# Patient Record
Sex: MALE | Race: WHITE | NOT HISPANIC OR LATINO | Employment: FULL TIME | URBAN - METROPOLITAN AREA
[De-identification: names, ages, dates, MRNs, and addresses within clinical notes are randomized per-mention and may not be internally consistent; named-entity substitution may affect disease eponyms.]

---

## 2022-02-10 DIAGNOSIS — R42 MAL DE DEBARQUEMENT: Primary | ICD-10-CM

## 2022-02-10 DIAGNOSIS — G54.0 TOS (THORACIC OUTLET SYNDROME): ICD-10-CM

## 2022-02-10 DIAGNOSIS — I87.1 COMPRESSION OF VEIN: ICD-10-CM

## 2022-02-11 ENCOUNTER — TELEPHONE (OUTPATIENT)
Dept: NEUROLOGY | Facility: CLINIC | Age: 57
End: 2022-02-11
Payer: COMMERCIAL

## 2022-02-11 NOTE — TELEPHONE ENCOUNTER
I called pt to review his questions. He sees ENT who diagnosed MdDS and prescribes his klonopin which helps his symptoms some.     He originally had MdDS symptoms last 1 year, remission 2 years, back the last 1 1/2 years after kayaking.     He is from [place] and has family here. He is planning to stay here around 1-2 weeks when he comes up to see Dr. Hernandez. He will schedule ordered imaging. He is wondering if anything else should be set up while here.     I let him know we can give him our recommendations and he can coordinate treatment in [place].     He wanted to know common treatments we use. We discussed PT, botox, [facility]. He has done some PT with no relief. Was wondering what exactly our therapists work on.     Will update Dr. Hernandez on our conversation.     Elba SANTORO

## 2022-04-03 ENCOUNTER — HEALTH MAINTENANCE LETTER (OUTPATIENT)
Age: 57
End: 2022-04-03

## 2022-05-10 ENCOUNTER — TRANSFERRED RECORDS (OUTPATIENT)
Dept: HEALTH INFORMATION MANAGEMENT | Facility: CLINIC | Age: 57
End: 2022-05-10

## 2022-06-26 NOTE — TELEPHONE ENCOUNTER
6/27/2022-[employer] Medical Message sent to patient asking for info on where to get records and Images for his appointment-MR @ 527am        FUTURE VISIT INFORMATION      FUTURE VISIT INFORMATION:    Date: 7/13/2022    Time: 130pm    Location: [facility] [place]  REFERRAL INFORMATION:    Referring provider:      Referring providers clinic:      Reason for visit/diagnosis  Mal de Debarquement     RECORDS REQUESTED FROM:       Clinic name Comments Records Status Imaging Status   [facility]   Scanned to Chart No Images

## 2022-07-12 ENCOUNTER — ANCILLARY PROCEDURE (OUTPATIENT)
Dept: CT IMAGING | Facility: CLINIC | Age: 57
End: 2022-07-12
Attending: PSYCHIATRY & NEUROLOGY
Payer: COMMERCIAL

## 2022-07-12 DIAGNOSIS — R42 MAL DE DEBARQUEMENT: ICD-10-CM

## 2022-07-12 DIAGNOSIS — I87.1 COMPRESSION OF VEIN: ICD-10-CM

## 2022-07-12 DIAGNOSIS — G54.0 TOS (THORACIC OUTLET SYNDROME): ICD-10-CM

## 2022-07-12 PROCEDURE — 70498 CT ANGIOGRAPHY NECK: CPT | Performed by: RADIOLOGY

## 2022-07-12 PROCEDURE — 70496 CT ANGIOGRAPHY HEAD: CPT | Performed by: RADIOLOGY

## 2022-07-12 RX ORDER — IOPAMIDOL 755 MG/ML
75 INJECTION, SOLUTION INTRAVASCULAR ONCE
Status: COMPLETED | OUTPATIENT
Start: 2022-07-12 | End: 2022-07-12

## 2022-07-12 RX ADMIN — IOPAMIDOL 75 ML: 755 INJECTION, SOLUTION INTRAVASCULAR at 11:21

## 2022-07-12 NOTE — PROGRESS NOTES
[facility]    Neurology Consult    7/13/2022      Fer Cantu MRN# [id number]   YOB: 1965 Age: 56 year old      Primary care provider:   No primary care provider on file.    Requesting provider:   Self    Reason for Consult:  Mal de Debarquement Syndrome    IMPRESSIONS:  Fer Cantu is a 56 year old male with a past medical history of two episodes of mal de debarquement syndrome one after a cruise in November 2017 and one after a kayak expedition in October 2020.  Symptoms occurred within 48 hours of disembarking from the vessels and improved with re-exposure exposure to passive motion such as driving.  He thus meets [employer] Society criteria for mal de debarquement syndrome.     He has had some response to clonazepam.  He did not have sustained response to venlafaxine.  He is off the second medication now.  We talked about alternatives to medication as well as the possibility that some of the imaging findings from the ultrasound and CT venogram and his neck tightness may be related to be feeling of motion.  This is a newly consider possibility, i.e. that some of the feeling of oscillating motion could be from increased venous pressure in the inner ear due to extracranial venous compression particularly from tight muscles in the neck.  We discussed this pathophysiology and I showed him pictures of the areas of compression in the neck. There is some compression of the right internal jugular vein at the level of C1 with head flexion as well as slowed flow in the right subclavian vein with arm abduction.     We do not have the full picture of how extracranial venous compressions can lead to oscillating vertigo but we certainly have had patients who have benefited from manual therapies such as upper cervical spinal alignment procedures and neck physical therapy in relieving at least some of the feelings of oscillating motion.  Thus, it would be  worthwhile for him to explore both avenues. 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     Recommendations:  1.  If more medication options are considered I would try 25 mg of desvenlafaxine.  It can be raised to 50 mg a day after 4 weeks.  2.  Clonazepam can be continued at his current dose but I would not raise the total dose to more than 1 mg/day. The dose could be divided into 0.5 mg twice a day.  3.  I would recommend seeking care from a [facility] chiropractor in his area.  This form of chiropractic involves atlas alignment without any high acceleration neck twisting.  4.  He would benefit from seeing a physical therapist who is knowledgeable in treating thoracic outlet syndrome.  Of note he does not have any arm symptoms but does have evidence of subclavian vein compression.  The physical therapy should focus on first rib mobilization, anterior scalene and sternocleidomastoid muscle stretching, pectoralis minor stretches, ergonomic awareness and attention to posture especially when he is in front of the computer.  5. Since the patient is from [place], we have not scheduled a follow up but he is welcome to send me questions on [employer].     HISTORY OF PRESENT ILLNESS:  Fer Cantu is a 56 year old male presents with persistent oscillating vertigo after disembarking from a cruise (5 days, [facility] from [place]) on November 2017. He dd not get sick but there were strong wakes on the last day of the trip. He did not get motion sick. After getting off the cruise, he felt the motion right away. He had felt a gravitational pull, the floor moving, and rocking/awaying that got better driving car.  The rocking does not go with his pulse. He saw an ENT and had an audiogram and was diagnosed with MdDS. He had not had vestibular function testing. He was treated with clonazepam which did help reduce the symptoms. He also tried CBD which did not help.     The symptoms lasted a  year and subsided on its own. However, he had taken Bactrim two weeks before the symptoms went away so he wonders about that association. He had been cruises for up to a month in a 1990's and he would be totally fine. He never had a problem with motion sickness even when the roll stabilizers were off.    He was fine for two years. He went off of the clonazepam.. He went on a kayak a few times during his asymptomatic periods and he was fine.    In October 2020 the symptoms came back after a kayak trip. He was out on the water for an hour but it was very windy and the wakes were very strong. He was not scared but was actually having fun. He was fine the day of the trip. The very next day, the symptoms of the rocking came back very strong. He felt as if the ground was moving.     The symptoms have never gone away since then. The strongest sensation is the rocking/sensation and a gravitational pull downward. There is still improvement with driving. He had tried the VOR re-adaptation therapy with some instructions around January 2022. This did not help.     He has been managed with clonazepam 1 mg a day in the morning and venlafaxine which had recently been increased to 150 mg a day and then taken off because it didn't help. It seemed to help in the beginning, however. He did not have withdrawal effects when he stopped. He has not been on any other medications for this.     The rocking does not get stronger in any particular head direction. The rocking is better when he lays down. It does not prevent him from falling asleep. He has become more sensitive to computer work. He is not very visually motion sensitive, otherwise. There has been fatigue because the constant motion is distracting. He notices that symptoms are milder when he gets more sleep. He usually sleeps 6-7-hours. His symptoms are stronger when he doesn't get enough sleep.    Dizziness:   He has never had not spinning vertigo.     Headache:   There has been  a little bit of pulsing pressure in the temples that goes with rocking but not with the pulse. The cycling is slower than the pulse.     Aural:   There has been some ear pressure that comes and goes, not with the rocking. There is no tinnitus or hearing loss.    Neck/Upper extremity:  There is some neck stiffness at the back of the neck on both sides. There is no tightness in the shoulders. There is no numbness, tingling, or weakness in the arms or hands. There has been no swelling or color change in the hands.     Bulbar:  No throat pain, swallowing problems.     Cardiac:  No chest pain shortness of breath, palpitations, syncope. He does not feel faint.     OTHER: He walks for exercise. He does not do any upper body exercise. He has stopped doing kayaking.     PAST MEDICAL HISTORY:  Hypertension  BPH    PAST SURGICAL HISTORY:  None    SOCIAL HISTORY: , never smoked, working full time.     ALLERGIES:  No Known Allergies     MEDICATIONS:    Current Outpatient Medications:      clonazePAM (KLONOPIN) 1 MG tablet, TAKE ONE (1) TABLET (1 MG TOTAL) BY MOUTH ONCE DAILY FOR VERTIGO, Disp: , Rfl:      doxazosin (CARDURA) 4 MG tablet, TAKE ONE (1) TABLET(S) BY MOUTH EVERY DAY FOR BLOOD PRESSURE AND PROSTATE, TAKE 4 HOURS APART FROM SILDENAFIL., Disp: , Rfl:      lisinopril (ZESTRIL) 20 MG tablet, Take 1 tablet by mouth daily, Disp: , Rfl:     PHYSICAL EXAM:  Vitals: BP (!) 147/84   Pulse 89   Resp 16   Wt 91.6 kg (202 lb)   SpO2 98%     General: Patient is well-nourished, well-groomed, in no apparent distress. There was no dominant rhythm.     HEENT: Head is atraumatic, eyes look normal exteriorly, throat clear, neck supple.  Ext: Warm, well-perfused. No edema.    Neurologic:  Mental Status: Alert and oriented to person, place, time, and situation.  Able to provide an excellent history.     Cranial Nerves: Visual fields full to confrontation.  Pupils equal and reactive to light.  Extraocular movements full.  Face  sensation normal.  Normal head impulse testing.  Normal hearing to finger rub. Face symmetric with normal movements. Tongue and palate midline.  Normal shoulder elevation.      Motor: Normal bulk and tone.  No pronator drift.  Normal foot taps.  Full strength to confrontational testing.    Sensory: Normal light touch, temperature, and vibration.    Reflexes: Biceps, Brachioradialis, Triceps, Knees, Ankles 2/4.     Coordination: Normal finger to nose, rapid alternating movements    Gait: Normal stance width.  Negative Romberg.  Good gait initiation.  Good stride length.  Good arm swing.  Normal turn. Able to walk 5 steps in tandem.      Upper Limb Tension Test for Thoracic Outlet Syndrome:  Arms abducted: Numbness and tingling: none    Arms abducted head turned to the RIGHT: none  Arms abducted head turned to the LEFT: none    Pain Right scalene: none  Pain Left scalene: none    Pain Right sternocleidomastoid: none  Pain Left sternocleidomastoid: none    Pain under the RIGHT clavicle: none  Pain at the RIGHT 1st rib-sternum insertion site: none  Pain under the LEFT clavicle: none  Pain at the LEFT 1st rib-sternum insertion site: YES, no radiation     DATA:  All available and relevant labs, imaging, and other procedures were reviewed personally.   Last brain imaging:  CTV Head w Contrast  Narrative: EXAM: CTV HEAD NECK W CONTRAST, 7/12/2022 11:42 AM    HISTORY:  56M with chronic rocking vertigo, question venous  compression, styloid length; Mal de debarquement; Compression of vein;  TOS (thoracic outlet syndrome).    COMPARISON:  Venous ultrasound 7/12/2022.      TECHNIQUE: Helically acquired thin section axial CT images were  obtained with 1 mm collimation through the head and neck after  intravenous bolus injection of iodinated contrast medium with a delay  between administration of contrast and scanning. Imaging performed in  the neutral position and with neck in flexion. Image data were sent to  the 3D workstation  and postprocessed by the technologist using maximum  intensity pixel (MIP), multiplanar and volume rendered 3D  reconstruction programs.     CONTRAST: Isovue 370 75cc.    FINDINGS:     Head CTV:  No thrombosis or stenosis of the major intracranial dural sinuses or  deep cerebral veins. Dominant left-sided venous drainage.    Neck CTV:  The right styloid process measures 26 mm. The left styloid process  measures 23 mm.    No venous thrombosis.    Right internal jugular vein: Mild narrowing of the upper right  internal jugular vein as it courses posterior to the right styloid  process. Increase in extent of narrowing with flexion.    Left internal jugular vein: Moderate narrowing of the upper left  internal jugular vein as it courses between the posterior belly of the  digastric in the left transverse process of C1. Increase in extent of  narrowing with flexion.    Extensive effacement of the bilateral mid internal jugular veins,  greatest on the right.    Mild paranasal sinus mucosal thickening. Mild atherosclerotic  calcifications of the bilateral carotid bifurcations. No suspicious  finding in the visualized superior mediastinum/thorax. Clear lung  apices.  Impression: IMPRESSION:   1. No intracranial venous thrombosis.  2. No jugular venous thrombosis.  3. Narrowing of the bilateral upper internal jugular veins, increasing  in extent with flexion. This is of indeterminate significance and can  be seen in the absence of symptoms.    SARINA LONDONO MD               [facility] Thor Outlet Syn Bilat  Narrative: THORACIC INLET/OUTLET DUPLEX ULTRASOUND 7/12/2022     CLINICAL HISTORY: 56M with chronic rocking vertigo. Question venous  compression.; Mal de debarquement; Compression of vein; TOS (thoracic  outlet syndrome).     COMPARISONS: None available.    REFERRING PROVIDER: PRINCESS ROMERO CHA    TECHNIQUE: Bilateral innominate, subclavian, and axillary veins were  evaluated grayscale, color Doppler, Doppler  waveform ultrasound.  Bilateral subclavian veins were evaluated with color Doppler and  Doppler waveform imaging through abduction maneuvers.    Bilateral index finger PPG's obtained at rest and with provocative  positions.    Bilateral internal jugular veins evaluated at rest with grayscale,  color Doppler, and Doppler waveform ultrasound. Bilateral internal  jugular veins evaluated with color Doppler and Doppler waveform  ultrasound through maneuvers.    FINDINGS:  RIGHT:       REST:            INTERNAL JUGULAR VEIN: 26 cm/s, phasic, fully compressible            INNOMINATE VEIN: 70 cm/s, phasic            SUBCLAVIAN VEIN, medial: 70 cm/s, phasic            SUBCLAVIAN VEIN, mid: 99 cm/s, phasic, fully compressible            SUBCLAVIAN VEIN, lateral: 43 cm/s, phasic, fully  compressible            AXILLARY VEIN: 55 cm/s, phasic, fully compressible         MID SUBCLAVIAN VEIN, sitting erect:            0 degrees: 83 cm/s, phasic            90 degrees: 20 cm/s, flattened            135 degrees: 153 cm/s, phasic            180 degrees: 214 cm/s, phasic         INTERNAL JUGULAR VEIN, sitting erect:            Neutral: 108 cm/s, phasic            Right: 169 cm/s, phasic            Left: 122 cm/s, phasic            Extension: 145 cm/s, phasic            Flexion: 125 cm/s, phasic         PPGs:            Baseline: Normal            Arms 90: Normal            Arms 180: Normal              : ABNORMAL - diminished             head right: Normal             head left: ABNORMAL - OCCLUDED    LEFT:       REST:            INTERNAL JUGULAR VEIN: 128 cm/s, phasic, fully compressible            INNOMINATE VEIN: 65 cm/s, phasic            SUBCLAVIAN VEIN, medial: 167 cm/s, phasic            SUBCLAVIAN VEIN, mid: 162 cm/s, phasic, fully compressible            SUBCLAVIAN VEIN, lateral: 96 cm/s, phasic, fully  compressible            AXILLARY VEIN: 37 cm/s, phasic, fully compressible         MID SUBCLAVIAN  VEIN, sitting erect:            0 degrees: 113 cm/s, phasic            90 degrees: 240 cm/s, phasic            135 degrees: 259 cm/s, phasic            180 degrees: 233 cm/s, phasic         INTERNAL JUGULAR VEIN, sitting erect:            Neutral: 142 cm/s, phasic            Right: 214 cm/s, phasic            Left: 175 cm/s, phasic            Extension: 172 cm/s, phasic            Flexion: 214 cm/s, phasic        PPGs:            Baseline: Normal            Arms 90: Normal            Arms 180: ABNORMAL - diminished            : Normal             head right: Normal             head left: Normal  Impression: IMPRESSION: Thoracic outlet/inlet physiology suggested. Correlate for  symptoms.    1. RIGHT:       A. No subclavian venous stenosis suggested at rest.       B. Subclavian venous narrowing suggested in 90 degrees. No  occlusion demonstrated.       C. No internal jugular venous stenosis suggested with maneuvers.       D. PPG occlusive in  head left and diminished in   position.    2. LEFT:       A. No subclavian venous stenosis suggested at rest.       B. No subclavian venous stenosis suggested with maneuvers.       C. Left internal jugular vein velocity almost 5 times faster than  the right, etiology not demonstrated.  No internal jugular venous  stenosis suggested with maneuvers.       D. PPG diminished in Arms 180. No occlusion demonstrated.    OSITO ERWIN MD     94-minutes were spent in evaluation, examination, and documentation.

## 2022-07-13 ENCOUNTER — OFFICE VISIT (OUTPATIENT)
Dept: NEUROLOGY | Facility: CLINIC | Age: 57
End: 2022-07-13
Payer: COMMERCIAL

## 2022-07-13 ENCOUNTER — PRE VISIT (OUTPATIENT)
Dept: NEUROLOGY | Facility: CLINIC | Age: 57
End: 2022-07-13

## 2022-07-13 VITALS
DIASTOLIC BLOOD PRESSURE: 84 MMHG | SYSTOLIC BLOOD PRESSURE: 147 MMHG | WEIGHT: 202 LBS | HEART RATE: 89 BPM | RESPIRATION RATE: 16 BRPM | OXYGEN SATURATION: 98 %

## 2022-07-13 DIAGNOSIS — G54.0 TOS (THORACIC OUTLET SYNDROME): ICD-10-CM

## 2022-07-13 DIAGNOSIS — M24.20 EAGLE'S SYNDROME: ICD-10-CM

## 2022-07-13 DIAGNOSIS — G24.3 CERVICAL DYSTONIA: Primary | ICD-10-CM

## 2022-07-13 DIAGNOSIS — I87.1 COMPRESSION OF VEIN: ICD-10-CM

## 2022-07-13 DIAGNOSIS — R42 MAL DE DEBARQUEMENT: ICD-10-CM

## 2022-07-13 DIAGNOSIS — M79.10 MUSCLE PAIN: ICD-10-CM

## 2022-07-13 PROCEDURE — 99417 PROLNG OP E/M EACH 15 MIN: CPT | Performed by: PSYCHIATRY & NEUROLOGY

## 2022-07-13 PROCEDURE — 99205 OFFICE O/P NEW HI 60 MIN: CPT | Performed by: PSYCHIATRY & NEUROLOGY

## 2022-07-13 RX ORDER — DOXYCYCLINE 100 MG/1
CAPSULE ORAL
COMMUNITY
Start: 2021-08-30 | End: 2023-10-06

## 2022-07-13 RX ORDER — CLONAZEPAM 1 MG/1
TABLET ORAL
COMMUNITY
Start: 2021-10-13

## 2022-07-13 RX ORDER — DOXAZOSIN 4 MG/1
TABLET ORAL
COMMUNITY
Start: 2020-01-01

## 2022-07-13 RX ORDER — LISINOPRIL 20 MG/1
1 TABLET ORAL DAILY
COMMUNITY
Start: 2013-01-01

## 2022-07-13 RX ORDER — DOXYCYCLINE 100 MG/1
CAPSULE ORAL
COMMUNITY
Start: 2021-11-10 | End: 2023-10-06

## 2022-07-13 ASSESSMENT — PAIN SCALES - GENERAL: PAINLEVEL: NO PAIN (0)

## 2022-07-13 NOTE — LETTER
7/13/2022     RE: Fer Cantu  [id number]  [facility] [address]     Dear Colleague,    Thank you for referring your patient, Fer Cantu, to the [facility] NEUROLOGY [facility] [place] at [facility]. Please see a copy of my visit note below.    [facility]    Neurology Consult    7/13/2022      Fer Cantu MRN# [id number]   YOB: 1965 Age: 56 year old      Primary care provider:   No primary care provider on file.    Requesting provider:   Self    Reason for Consult:  Mal de Debarquement Syndrome    IMPRESSIONS:  Fer Cantu is a 56 year old male with a past medical history of two episodes of mal de debarquement syndrome one after a cruise in November 2017 and one after a kayak expedition in October 2020.  Symptoms occurred within 48 hours of disembarking from the vessels and improved with re-exposure exposure to passive motion such as driving.  He thus meets [employer] Society criteria for mal de debarquement syndrome.     He has had some response to clonazepam.  He did not have sustained response to venlafaxine.  He is off the second medication now.  We talked about alternatives to medication as well as the possibility that some of the imaging findings from the ultrasound and CT venogram and his neck tightness may be related to be feeling of motion.  This is a newly consider possibility, i.e. that some of the feeling of oscillating motion could be from increased venous pressure in the inner ear due to extracranial venous compression.  We discussed this pathophysiology and I showed him pictures of the areas of compression in the neck. There is some compression of the right internal jugular vein at the level of C1 with head flexion as well as slowed flow in the right subclavian vein with arm abduction.     We do not have the full picture of how extracranial venous  compressions can lead to oscillating vertigo but we certainly have had patients who have benefited from manual therapies such as upper cervical spinal alignment procedures and neck physical therapy in relieving at least some of the feelings of oscillating motion.  Thus, it would be worthwhile for him to explore both avenues.    Recommendations:  1.  If more medication options are considered I would try 25 mg of desvenlafaxine.  It can be raised to 50 mg a day after 4 weeks.  2.  Clonazepam can be continued at his current dose but I would not raise the total dose to more than 1 mg/day. The dose could be divided into 0.5 mg twice a day.  3.  I would recommend seeking care from a [facility] chiropractor in his area.  This form of chiropractic involves atlas alignment without any high acceleration neck twisting.  4.  He would benefit from seeing a physical therapist who is knowledgeable in treating thoracic outlet syndrome.  Of note he does not have any arm symptoms but does have evidence of subclavian vein compression.  The physical therapy should focus on first rib mobilization, anterior scalene and sternocleidomastoid muscle stretching, pectoralis minor stretches, ergonomic awareness and attention to posture especially when he is in front of the computer.  5. Since the patient is from [place], we have not scheduled a follow up but he is welcome to send me questions on [employer].     HISTORY OF PRESENT ILLNESS:  Fer Cantu is a 56 year old male presents with persistent oscillating vertigo after disembarking from a cruise (5 days, [place] from [place]) on November 2017. He dd not get sick but there were strong wakes on the last day of the trip. He did not get motion sick. After getting off the cruise, he felt the motion right away. He had felt a gravitational pull, the floor moving, and rocking/awaying that got better driving car.  The rocking does not go with his pulse. He saw an ENT and had an audiogram and was  diagnosed with MdDS. He had not had vestibular function testing. He was treated with clonazepam which did help reduce the symptoms. He also tried CBD which did not help.     The symptoms lasted a year and subsided on its own. However, he had taken Bactrim two weeks before the symptoms went away so he wonders about that association. He had been cruises for up to a month in a 1990's and he would be totally fine. He never had a problem with motion sickness even when the roll stabilizers were off.    He was fine for two years. He went off of the clonazepam.. He went on a kayak a few times during his asymptomatic periods and he was fine.    In October 2020 the symptoms came back after a kayak trip. He was out on the water for an hour but it was very windy and the wakes were very strong. He was not scared but was actually having fun. He was fine the day of the trip. The very next day, the symptoms of the rocking came back very strong. He felt as if the ground was moving.     The symptoms have never gone away since then. The strongest sensation is the rocking/sensation and a gravitational pull downward. There is still improvement with driving. He had tried the VOR re-adaptation therapy with some instructions around January 2022. This did not help.     He has been managed with clonazepam 1 mg a day in the morning and venlafaxine which had recently been increased to 150 mg a day and then taken off because it didn't help. It seemed to help in the beginning, however. He did not have withdrawal effects when he stopped. He has not been on any other medications for this.     The rocking does not get stronger in any particular head direction. The rocking is better when he lays down. It does not prevent him from falling asleep. He has become more sensitive to computer work. He is not very visually motion sensitive, otherwise. There has been fatigue because the constant motion is distracting. He notices that symptoms are milder when  he gets more sleep. He usually sleeps 6-7-hours. His symptoms are stronger when he doesn't get enough sleep.    Dizziness:   He has never had not spinning vertigo.     Headache:   There has been a little bit of pulsing pressure in the temples that goes with rocking but not with the pulse. The cycling is slower than the pulse.     Aural:   There has been some ear pressure that comes and goes, not with the rocking. There is no tinnitus or hearing loss.    Neck/Upper extremity:  There is no neck pain. There is some neck stiffness at the back of the neck on both sides. There is no tightness in the shoulders. There is no numbness, tingling, or weakness in the arms or hands. There has been no swelling or color change in the hands.     Bulbar:  No throat pain, swallowing problems.     Cardiac:  No chest pain shortness of breath, palpitations, syncope. He does not feel faint.     OTHER: He walks for exercise. He does not do any upper body exercise. He has stopped doing kayaking.     PAST MEDICAL HISTORY:  Hypertension  BPH    PAST SURGICAL HISTORY:  None    SOCIAL HISTORY: , never smoked, working full time.     ALLERGIES:  No Known Allergies     MEDICATIONS:    Current Outpatient Medications:      clonazePAM (KLONOPIN) 1 MG tablet, TAKE ONE (1) TABLET (1 MG TOTAL) BY MOUTH ONCE DAILY FOR VERTIGO, Disp: , Rfl:      doxazosin (CARDURA) 4 MG tablet, TAKE ONE (1) TABLET(S) BY MOUTH EVERY DAY FOR BLOOD PRESSURE AND PROSTATE, TAKE 4 HOURS APART FROM SILDENAFIL., Disp: , Rfl:      lisinopril (ZESTRIL) 20 MG tablet, Take 1 tablet by mouth daily, Disp: , Rfl:     PHYSICAL EXAM:  Vitals: BP (!) 147/84   Pulse 89   Resp 16   Wt 91.6 kg (202 lb)   SpO2 98%     General: Patient is well-nourished, well-groomed, in no apparent distress. There was no dominant rhythm.     HEENT: Head is atraumatic, eyes look normal exteriorly, throat clear, neck supple.  Ext: Warm, well-perfused. No edema.    Neurologic:  Mental Status: Alert and  oriented to person, place, time, and situation.  Able to provide an excellent history.     Cranial Nerves: Visual fields full to confrontation.  Pupils equal and reactive to light.  Extraocular movements full.  Face sensation normal.  Normal head impulse testing.  Normal hearing to finger rub. Face symmetric with normal movements. Tongue and palate midline.  Normal shoulder elevation.      Motor: Normal bulk and tone.  No pronator drift.  Normal foot taps.  Full strength to confrontational testing.    Sensory: Normal light touch, temperature, and vibration.    Reflexes: Biceps, Brachioradialis, Triceps, Knees, Ankles 2/4.     Coordination: Normal finger to nose, rapid alternating movements    Gait: Normal stance width.  Negative Romberg.  Good gait initiation.  Good stride length.  Good arm swing.  Normal turn. Able to walk 5 steps in tandem.      Upper Limb Tension Test for Thoracic Outlet Syndrome:  Arms abducted: Numbness and tingling: none    Arms abducted head turned to the RIGHT: none  Arms abducted head turned to the LEFT: none    Pain Right scalene: none  Pain Left scalene: none    Pain Right sternocleidomastoid: none  Pain Left sternocleidomastoid: none    Pain under the RIGHT clavicle: none  Pain at the RIGHT 1st rib-sternum insertion site: none  Pain under the LEFT clavicle: none  Pain at the LEFT 1st rib-sternum insertion site: YES, no radiation     DATA:  All available and relevant labs, imaging, and other procedures were reviewed personally.   Last brain imaging:  CTV Head w Contrast  Narrative: EXAM: CTV HEAD NECK W CONTRAST, 7/12/2022 11:42 AM    HISTORY:  56M with chronic rocking vertigo, question venous  compression, styloid length; Mal de debarquement; Compression of vein;  TOS (thoracic outlet syndrome).    COMPARISON:  Venous ultrasound 7/12/2022.      TECHNIQUE: Helically acquired thin section axial CT images were  obtained with 1 mm collimation through the head and neck after  intravenous  bolus injection of iodinated contrast medium with a delay  between administration of contrast and scanning. Imaging performed in  the neutral position and with neck in flexion. Image data were sent to  the 3D workstation and postprocessed by the technologist using maximum  intensity pixel (MIP), multiplanar and volume rendered 3D  reconstruction programs.     CONTRAST: Isovue 370 75cc.    FINDINGS:     Head CTV:  No thrombosis or stenosis of the major intracranial dural sinuses or  deep cerebral veins. Dominant left-sided venous drainage.    Neck CTV:  The right styloid process measures 26 mm. The left styloid process  measures 23 mm.    No venous thrombosis.    Right internal jugular vein: Mild narrowing of the upper right  internal jugular vein as it courses posterior to the right styloid  process. Increase in extent of narrowing with flexion.    Left internal jugular vein: Moderate narrowing of the upper left  internal jugular vein as it courses between the posterior belly of the  digastric in the left transverse process of C1. Increase in extent of  narrowing with flexion.    Extensive effacement of the bilateral mid internal jugular veins,  greatest on the right.    Mild paranasal sinus mucosal thickening. Mild atherosclerotic  calcifications of the bilateral carotid bifurcations. No suspicious  finding in the visualized superior mediastinum/thorax. Clear lung  apices.  Impression: IMPRESSION:   1. No intracranial venous thrombosis.  2. No jugular venous thrombosis.  3. Narrowing of the bilateral upper internal jugular veins, increasing  in extent with flexion. This is of indeterminate significance and can  be seen in the absence of symptoms.    SARINA LONDONO MD               US Up Ex [facility] Oneil Thor Outlet Syn Bilat  Narrative: THORACIC INLET/OUTLET DUPLEX ULTRASOUND 7/12/2022     CLINICAL HISTORY: 56M with chronic rocking vertigo. Question venous  compression.; Mal de debarquement; Compression of vein; TOS  (thoracic  outlet syndrome).     COMPARISONS: None available.    REFERRING PROVIDER: PRINCESS ROMERO CHA    TECHNIQUE: Bilateral innominate, subclavian, and axillary veins were  evaluated grayscale, color Doppler, Doppler waveform ultrasound.  Bilateral subclavian veins were evaluated with color Doppler and  Doppler waveform imaging through abduction maneuvers.    Bilateral index finger PPG's obtained at rest and with provocative  positions.    Bilateral internal jugular veins evaluated at rest with grayscale,  color Doppler, and Doppler waveform ultrasound. Bilateral internal  jugular veins evaluated with color Doppler and Doppler waveform  ultrasound through maneuvers.    FINDINGS:  RIGHT:       REST:            INTERNAL JUGULAR VEIN: 26 cm/s, phasic, fully compressible            INNOMINATE VEIN: 70 cm/s, phasic            SUBCLAVIAN VEIN, medial: 70 cm/s, phasic            SUBCLAVIAN VEIN, mid: 99 cm/s, phasic, fully compressible            SUBCLAVIAN VEIN, lateral: 43 cm/s, phasic, fully  compressible            AXILLARY VEIN: 55 cm/s, phasic, fully compressible         MID SUBCLAVIAN VEIN, sitting erect:            0 degrees: 83 cm/s, phasic            90 degrees: 20 cm/s, flattened            135 degrees: 153 cm/s, phasic            180 degrees: 214 cm/s, phasic         INTERNAL JUGULAR VEIN, sitting erect:            Neutral: 108 cm/s, phasic            Right: 169 cm/s, phasic            Left: 122 cm/s, phasic            Extension: 145 cm/s, phasic            Flexion: 125 cm/s, phasic         PPGs:            Baseline: Normal            Arms 90: Normal            Arms 180: Normal              : ABNORMAL - diminished             head right: Normal             head left: ABNORMAL - OCCLUDED    LEFT:       REST:            INTERNAL JUGULAR VEIN: 128 cm/s, phasic, fully compressible            INNOMINATE VEIN: 65 cm/s, phasic            SUBCLAVIAN VEIN, medial: 167 cm/s, phasic             SUBCLAVIAN VEIN, mid: 162 cm/s, phasic, fully compressible            SUBCLAVIAN VEIN, lateral: 96 cm/s, phasic, fully  compressible            AXILLARY VEIN: 37 cm/s, phasic, fully compressible         MID SUBCLAVIAN VEIN, sitting erect:            0 degrees: 113 cm/s, phasic            90 degrees: 240 cm/s, phasic            135 degrees: 259 cm/s, phasic            180 degrees: 233 cm/s, phasic         INTERNAL JUGULAR VEIN, sitting erect:            Neutral: 142 cm/s, phasic            Right: 214 cm/s, phasic            Left: 175 cm/s, phasic            Extension: 172 cm/s, phasic            Flexion: 214 cm/s, phasic        PPGs:            Baseline: Normal            Arms 90: Normal            Arms 180: ABNORMAL - diminished            : Normal             head right: Normal             head left: Normal  Impression: IMPRESSION: Thoracic outlet/inlet physiology suggested. Correlate for  symptoms.    1. RIGHT:       A. No subclavian venous stenosis suggested at rest.       B. Subclavian venous narrowing suggested in 90 degrees. No  occlusion demonstrated.       C. No internal jugular venous stenosis suggested with maneuvers.       D. PPG occlusive in  head left and diminished in   position.    2. LEFT:       A. No subclavian venous stenosis suggested at rest.       B. No subclavian venous stenosis suggested with maneuvers.       C. Left internal jugular vein velocity almost 5 times faster than  the right, etiology not demonstrated.  No internal jugular venous  stenosis suggested with maneuvers.       D. PPG diminished in Arms 180. No occlusion demonstrated.    OSITO ERWIN MD     94-minutes were spent in evaluation, examination, and documentation.    Again, thank you for allowing me to participate in the care of your patient.      Sincerely,    Laya KEARNEY Cha, MD

## 2022-10-03 ENCOUNTER — HEALTH MAINTENANCE LETTER (OUTPATIENT)
Age: 57
End: 2022-10-03

## 2023-03-27 DIAGNOSIS — G24.3 CERVICAL DYSTONIA: Primary | ICD-10-CM

## 2023-04-28 ENCOUNTER — TELEPHONE (OUTPATIENT)
Dept: NEUROLOGY | Facility: CLINIC | Age: 58
End: 2023-04-28
Payer: COMMERCIAL

## 2023-04-28 NOTE — TELEPHONE ENCOUNTER
Thank you for the follow up. I have talked to Linsey again, and while you may not be aware of all it took to arrange my first visit with Dr. Hernandez, it was a lot of work. However, with the helpful, caring people I worked with, it came right down to the wire, but so grateful that it worked out.      With the [employer] agent, we had to start another network deficiency filing, which we did today. [facility] ,  Andrea MCMILLAN  needs  updated clinicals (referral ) for the deficiency case. He said to fax them to: [phone] and include Dr. Hernandez's name, updates clinical /referal, the Reference # [id number] along with my tna Member ID:  [id number] on that fax.      I very much appreciate your help in getting this over to them as soon as you're able, which will help prevent any disruptions or delays as we ran into on setting up the initial clearance for the visit.     Thank you so much!  -Sesar Cantu

## 2023-04-28 NOTE — TELEPHONE ENCOUNTER
----- Message from Su Severson sent at 4/27/2023  2:13 PM CDT -----  Regarding: RE: Botox approval  Hi Dr. Hernandez,     For this visit, do we have any other costs to this appointment other than the office visit and the Botox itself? Or is there a procedure cost too?  The patient is wondering for cost estimate reasons.      ----- Message -----  From: Laya Hernandez MD  Sent: 4/27/2023  10:20 AM CDT  To: Leatha Izaguirre, YVAN; Su Severson; #  Subject: RE: Botox approval                               This is fantastic. This patient is going to be thrilled.  #Su/Leatha, could you add him to a Botox slot on 7/13 or 8/24?  Thank you, all.    ----- Message -----  From: Gosia Galeano  Sent: 4/27/2023   9:43 AM CDT  To: Laya ROMERO Cha, MD  Subject: RE: Botox approval                               Hi Dr. Hernandez,    This patient is good to go:    [facility] [place] [facility] () - PA approved for Dr. Hernandez to buy & bill for 400 units every 84 days from 03.28.23. - 03.27.24. with approval # [id number] - emailed BASHIR to pt on 04.27.23. for copay assistance    Thank you,    Gosia    ----- Message -----  From: Laya Hernandez MD  Sent: 3/27/2023   6:12 PM CDT  To: Gosia Galeano  Subject: Botox approval                                   Arnoldo Elise,  Can you please start a Botox approval for this patient?  [place] before coordinating travel plans here.  We are aiming for 5-18-23 as his injection date but will move if approval not in place.    Provider: Laya Hernandez  [facility] [place] [facility]  Diagnosis: Cervical dystonia G24.3  Notes: Mary note 7-13-22  Units: 100U    Thank you.  ~Laya Hernandez

## 2023-05-03 NOTE — TELEPHONE ENCOUNTER
Arnoldo Zhang,     I sent the order from 03.27.23. & office visit from 07.13.22. for the network deficiency filing case # [id number] & will let you know as soon as I have an update.     Thank you,     Gosia

## 2023-05-21 ENCOUNTER — HEALTH MAINTENANCE LETTER (OUTPATIENT)
Age: 58
End: 2023-05-21

## 2023-05-25 NOTE — TELEPHONE ENCOUNTER
Arnoldo Zhang,     The patient & I completed a conference call with Linsey & they had to withdraw the previous PA in order to initiate a new network deficiency case since they could not locate the one from [facility]. I received an email from the patient that the network deficiency was denied & asked that he upload the entire letter to [employer] so we can see what the next step is. I will let you know as soon as I have an update.     Thank you,     Gosia

## 2023-07-12 NOTE — PROGRESS NOTES
PROCEDURE NOTE: Intramuscular Botulinum Toxin-A (Botox) Injection Under Ultrasound Guidance    PROCEDURE DATE: 7/13/2023    PATIENT NAME: Fer Cantu  YOB: 1965    ATTENDING PHYSICIAN: Laya KEARNEY Cha, MD  PRIOR BOTOX Never    PREOPERATIVE DIAGNOSIS:   Cervical dystonia  (primary encounter diagnosis)  TOS (thoracic outlet syndrome)  Compression of vein  Mal de debarquement  Muscle pain     POSTOPERATIVE DIAGNOSIS: same    PROCEDURE PERFORMED: Bilateral Sternocleidomastoid and Anterior Scalene Muscle Botulinum Toxin-A (Botox) Injection Under Ultrasound Guidance    ULTRASOUND WAS USED.     INDICATIONS FOR THE PROCEDURE:  Fer Cantu is a 57 year old male who presents with cervical dystonia.    PROCEDURE AND FINDINGS:  He was greeted in the clinic. The risk, benefits and alternatives to the procedure were again reviewed with him and informed consent was obtained and the patient agreed to proceed. A time-out was performed. Following review alternatives, benefits and risks, the procedure was carried out under sterile prep with sterile gel. The use of direct sonographic guidance was used to ensure accurate placement of the needle (rather than non-guided injection) and required to minimize the risk of bleeding or injury to nearby neurovascular structures. Images were recorded and stored.     A 15-6MHz ultrasound transducer was used to visualize the relevant structures and determine the optimal needle path for the procedure. A 27 gauge 1.5 inch needle was advanced utilizing an out-of-plane approach, under continuous ultrasound guidance to the sternocleidomastoid and anterior scalene muscle(s) on the bilateral side(s). The tip of the needle was visualized throughout the procedure. The remainder of the single-use vials were discarded.      100 units of botulinum toxin was diluted 50 units/mL of preservative free saline. The following muscles were injected:    20 units per anterior scalene  muscle on the bilateral side(s)  20 units per sternocleidomastoid muscle on the bilateral side(s)  ____________________________________    80 total units used.   The remainder (20 units) of the single-use vials were discarded.        He tolerated the procedure well, was discharged home in stable condition.     Follow-up will be in clinic      COMPLICATIONS: None    COMMENTS: None

## 2023-07-13 ENCOUNTER — OFFICE VISIT (OUTPATIENT)
Dept: NEUROLOGY | Facility: CLINIC | Age: 58
End: 2023-07-13
Payer: COMMERCIAL

## 2023-07-13 VITALS
SYSTOLIC BLOOD PRESSURE: 144 MMHG | DIASTOLIC BLOOD PRESSURE: 78 MMHG | WEIGHT: 202 LBS | HEART RATE: 73 BPM | OXYGEN SATURATION: 98 %

## 2023-07-13 DIAGNOSIS — R42 MAL DE DEBARQUEMENT: ICD-10-CM

## 2023-07-13 DIAGNOSIS — G24.3 CERVICAL DYSTONIA: Primary | ICD-10-CM

## 2023-07-13 DIAGNOSIS — G54.0 TOS (THORACIC OUTLET SYNDROME): ICD-10-CM

## 2023-07-13 DIAGNOSIS — I87.1 COMPRESSION OF VEIN: ICD-10-CM

## 2023-07-13 DIAGNOSIS — M79.10 MUSCLE PAIN: ICD-10-CM

## 2023-07-13 PROCEDURE — 64616 CHEMODENERV MUSC NECK DYSTON: CPT | Mod: 50 | Performed by: PSYCHIATRY & NEUROLOGY

## 2023-07-13 ASSESSMENT — PAIN SCALES - GENERAL: PAINLEVEL: NO PAIN (0)

## 2023-07-13 NOTE — NURSING NOTE
Chief Complaint   Patient presents with     RECHECK     Follow up..     Thelma Gilliam CMA at 1:33 PM on 7/13/2023.

## 2023-07-13 NOTE — LETTER
7/13/2023       RE: Fer Cantu  [address]  [facility] [address]     Dear Colleague,    Thank you for referring your patient, Fer Cantu, to the [facility] NEUROLOGY CLINIC [place] at . Please see a copy of my visit note below.    PROCEDURE NOTE: Intramuscular Botulinum Toxin-A (Botox) Injection Under Ultrasound Guidance    PROCEDURE DATE: 7/13/2023    PATIENT NAME: Fer Cantu  YOB: 1965    ATTENDING PHYSICIAN: Laya KEARNEY Cha, MD  PRIOR BOTOX Never    PREOPERATIVE DIAGNOSIS:   Cervical dystonia  (primary encounter diagnosis)  TOS (thoracic outlet syndrome)  Compression of vein  Mal de debarquement  Muscle pain     POSTOPERATIVE DIAGNOSIS: same    PROCEDURE PERFORMED: Bilateral Sternocleidomastoid and Anterior Scalene Muscle Botulinum Toxin-A (Botox) Injection Under Ultrasound Guidance    ULTRASOUND WAS USED.     INDICATIONS FOR THE PROCEDURE:  Fer Cantu is a 57 year old male who presents with cervical dystonia.    PROCEDURE AND FINDINGS:  He was greeted in the clinic. The risk, benefits and alternatives to the procedure were again reviewed with him and informed consent was obtained and the patient agreed to proceed. A time-out was performed. Following review alternatives, benefits and risks, the procedure was carried out under sterile prep with sterile gel. The use of direct sonographic guidance was used to ensure accurate placement of the needle (rather than non-guided injection) and required to minimize the risk of bleeding or injury to nearby neurovascular structures. Images were recorded and stored.     A 15-6MHz ultrasound transducer was used to visualize the relevant structures and determine the optimal needle path for the procedure. A 27 gauge 1.5 inch needle was advanced utilizing an out-of-plane approach, under continuous ultrasound guidance to the sternocleidomastoid and  anterior scalene muscle(s) on the bilateral side(s). The tip of the needle was visualized throughout the procedure. The remainder of the single-use vials were discarded.      100 units of botulinum toxin was diluted 50 units/mL of preservative free saline. The following muscles were injected:    20 units per anterior scalene muscle on the bilateral side(s)  20 units per sternocleidomastoid muscle on the bilateral side(s)  ____________________________________    80 total units used.   The remainder (20 units) of the single-use vials were discarded.        He tolerated the procedure well, was discharged home in stable condition.     Follow-up will be in clinic      COMPLICATIONS: None    COMMENTS: None              Again, thank you for allowing me to participate in the care of your patient.      Sincerely,    Laya KEARNEY Cha, MD

## 2023-07-25 ENCOUNTER — TELEPHONE (OUTPATIENT)
Dept: NEUROLOGY | Facility: CLINIC | Age: 58
End: 2023-07-25
Payer: COMMERCIAL

## 2023-07-25 NOTE — TELEPHONE ENCOUNTER
Please schedule pt for Thursday October 5th at 11:30 with Dr. Hernandez for Botox.  Pt is aware.  Once scheduled please remove hold. Thank you.

## 2023-09-22 ENCOUNTER — TELEPHONE (OUTPATIENT)
Dept: NEUROLOGY | Facility: CLINIC | Age: 58
End: 2023-09-22
Payer: COMMERCIAL

## 2023-09-22 NOTE — TELEPHONE ENCOUNTER
Arnoldo Zhang,    We do have auth on file for the Botox injections. I got a call on Wed from his insurance stating they need auth for patient to see  as she is out of network with his insurance - I was not aware of this.  So I submitted that auth on Wed and I am waiting to hear back. I will check back in the upcoming weeks    Sagrario Miranda, CAM

## 2023-09-22 NOTE — TELEPHONE ENCOUNTER
Per referral on 9/19 PA team faxed:   10.05.23 / [facility] [place] [facility] [place] BOTOX () - Faxed copy of provider order, LMN dated 06.06.23, and clinicals dated 08.25.23, 07.13.23, 05.25.23, 04.28.23, and 04.27.23.

## 2023-09-29 NOTE — TELEPHONE ENCOUNTER
Arnoldo Zhang,    Thanks for checking. PA Approved for Botox () for Dr. Hernandez for spasmodic torticollis for every 3 months from 06.13.23. - 06.13.24. with approval # [id number]    I just called his insurance and they state  is in network with [employer], and is network with patient's policy - no auth required for patient to see , no referral needed for patient to see .  Just need that PA for the Botox injection, which we have approval for, so patient is good to go    Sagrario Miranda, CAM

## 2023-10-04 NOTE — PROGRESS NOTES
PROCEDURE NOTE: Intramuscular Botulinum Toxin-A (Botox) Injection Under Ultrasound Guidance    PROCEDURE DATE: 10/4/2023    PATIENT NAME: Fer Cantu  YOB: 1965    ATTENDING PHYSICIAN: Laya KEARNEY Cha, MD  PRIOR BOTOX 7-13-23  20 units per anterior scalene muscle on the bilateral side(s)  20 units per sternocleidomastoid muscle on the bilateral side(s)    He noted significant improvement starting in the 5th week, lasted 3-4 weeks and then started to wear off. This was a clear improvement that helped him to stop thinking about his dizziness. This was at least 30-40%.  No dysphagia. No neck weakness.     PREOPERATIVE DIAGNOSIS:   Cervical dystonia  (primary encounter diagnosis)     POSTOPERATIVE DIAGNOSIS: same    PROCEDURE PERFORMED: Bilateral Sternocleidomastoid and Anterior Scalene Muscle Botulinum Toxin-A (Botox) Injection Under Ultrasound Guidance    ULTRASOUND WAS USED.     INDICATIONS FOR THE PROCEDURE:  Fer Cantu is a 58 year old male who presents with cervical dystonia.    PROCEDURE AND FINDINGS:  He was greeted in the clinic. The risk, benefits and alternatives to the procedure were again reviewed with him and informed consent was obtained and the patient agreed to proceed. A time-out was performed. Following review alternatives, benefits and risks, the procedure was carried out under sterile prep with sterile gel. The use of direct sonographic guidance was used to ensure accurate placement of the needle (rather than non-guided injection) and required to minimize the risk of bleeding or injury to nearby neurovascular structures. Images were recorded and stored.     A 15-6MHz ultrasound transducer was used to visualize the relevant structures and determine the optimal needle path for the procedure. A 27 gauge 1.5 inch needle was advanced utilizing an out-of-plane approach, under continuous ultrasound guidance to the sternocleidomastoid and anterior scalene muscle(s) on  the bilateral side(s). The tip of the needle was visualized throughout the procedure. The remainder of the single-use vials were discarded.      100 units of botulinum toxin was diluted 50 units/mL of preservative free saline. The following muscles were injected:    25 units per anterior scalene muscle on the bilateral side(s)  25 units per sternocleidomastoid muscle on the bilateral side(s)  ____________________________________    100 total units used.   The remainder (0 units) of the single-use vials were discarded.      He tolerated the procedure well, was discharged home in stable condition.     Follow-up will be in clinic      COMPLICATIONS: None    COMMENTS: None

## 2023-10-05 ENCOUNTER — OFFICE VISIT (OUTPATIENT)
Dept: NEUROLOGY | Facility: CLINIC | Age: 58
End: 2023-10-05
Payer: COMMERCIAL

## 2023-10-05 ENCOUNTER — ANCILLARY PROCEDURE (OUTPATIENT)
Dept: ULTRASOUND IMAGING | Facility: CLINIC | Age: 58
End: 2023-10-05
Attending: PSYCHIATRY & NEUROLOGY
Payer: COMMERCIAL

## 2023-10-05 VITALS
SYSTOLIC BLOOD PRESSURE: 150 MMHG | DIASTOLIC BLOOD PRESSURE: 89 MMHG | OXYGEN SATURATION: 98 % | RESPIRATION RATE: 16 BRPM | HEART RATE: 64 BPM

## 2023-10-05 DIAGNOSIS — I87.1 COMPRESSION OF VEIN: ICD-10-CM

## 2023-10-05 DIAGNOSIS — G24.3 CERVICAL DYSTONIA: Primary | ICD-10-CM

## 2023-10-05 DIAGNOSIS — G24.3 CERVICAL DYSTONIA: ICD-10-CM

## 2023-10-05 DIAGNOSIS — M79.10 MUSCLE PAIN: ICD-10-CM

## 2023-10-05 DIAGNOSIS — R42 MAL DE DEBARQUEMENT: ICD-10-CM

## 2023-10-05 PROCEDURE — 64616 CHEMODENERV MUSC NECK DYSTON: CPT | Mod: 50 | Performed by: PSYCHIATRY & NEUROLOGY

## 2023-10-05 NOTE — NURSING NOTE
Chief Complaint   Patient presents with    Procedure     Here for botox, confirmed with patient     Jessica Joshua

## 2023-10-05 NOTE — LETTER
10/5/2023       RE: Fer Cantu  [address]  [facility] [address]     Dear Colleague,    Thank you for referring your patient, Fer Cantu, to the [facility]UROLOGY [facility]. Please see a copy of my visit note below.    PROCEDURE NOTE: Intramuscular Botulinum Toxin-A (Botox) Injection Under Ultrasound Guidance    PROCEDURE DATE: 10/4/2023    PATIENT NAME: Fer Cantu  YOB: 1965    ATTENDING PHYSICIAN: Laya KEARNEY Cha, MD  PRIOR BOTOX 7-13-23  20 units per anterior scalene muscle on the bilateral side(s)  20 units per sternocleidomastoid muscle on the bilateral side(s)    He noted significant improvement starting in the 5th week, lasted 3-4 weeks and then started to wear off. This was a clear improvement that helped him to stop thinking about his dizziness. This was at least 30-40%.  No dysphagia. No neck weakness.     PREOPERATIVE DIAGNOSIS:   Cervical dystonia  (primary encounter diagnosis)     POSTOPERATIVE DIAGNOSIS: same    PROCEDURE PERFORMED: Bilateral Sternocleidomastoid and Anterior Scalene Muscle Botulinum Toxin-A (Botox) Injection Under Ultrasound Guidance    ULTRASOUND WAS USED.     INDICATIONS FOR THE PROCEDURE:  Fer Cantu is a 58 year old male who presents with cervical dystonia.    PROCEDURE AND FINDINGS:  He was greeted in the clinic. The risk, benefits and alternatives to the procedure were again reviewed with him and informed consent was obtained and the patient agreed to proceed. A time-out was performed. Following review alternatives, benefits and risks, the procedure was carried out under sterile prep with sterile gel. The use of direct sonographic guidance was used to ensure accurate placement of the needle (rather than non-guided injection) and required to minimize the risk of bleeding or injury to nearby neurovascular structures. Images were recorded  and stored.     A 15-6MHz ultrasound transducer was used to visualize the relevant structures and determine the optimal needle path for the procedure. A 27 gauge 1.5 inch needle was advanced utilizing an out-of-plane approach, under continuous ultrasound guidance to the sternocleidomastoid and anterior scalene muscle(s) on the bilateral side(s). The tip of the needle was visualized throughout the procedure. The remainder of the single-use vials were discarded.      100 units of botulinum toxin was diluted 50 units/mL of preservative free saline. The following muscles were injected:    25 units per anterior scalene muscle on the bilateral side(s)  25 units per sternocleidomastoid muscle on the bilateral side(s)  ____________________________________    100 total units used.   The remainder (0 units) of the single-use vials were discarded.      He tolerated the procedure well, was discharged home in stable condition.     Follow-up will be in clinic      COMPLICATIONS: None    COMMENTS: None             Again, thank you for allowing me to participate in the care of your patient.      Sincerely,    Laya KEARNEY Cha, MD

## 2023-11-07 DIAGNOSIS — R42 MAL DE DEBARQUEMENT: ICD-10-CM

## 2023-11-07 DIAGNOSIS — M24.20 EAGLE'S SYNDROME: Primary | ICD-10-CM

## 2023-11-07 DIAGNOSIS — I87.1 COMPRESSION OF VEIN: ICD-10-CM

## 2023-12-05 ENCOUNTER — CARE COORDINATION (OUTPATIENT)
Dept: NEUROLOGY | Facility: CLINIC | Age: 58
End: 2023-12-05
Payer: COMMERCIAL

## 2023-12-05 NOTE — PROGRESS NOTES
Letter from Dr Hernandez faxed to [facility] stating why he needs to be seen by Dr Spencer.  [phone]

## 2023-12-14 ENCOUNTER — CARE COORDINATION (OUTPATIENT)
Dept: NEUROLOGY | Facility: CLINIC | Age: 58
End: 2023-12-14
Payer: COMMERCIAL

## 2023-12-14 DIAGNOSIS — I87.1 COMPRESSION OF VEIN: ICD-10-CM

## 2023-12-14 DIAGNOSIS — M24.20 EAGLE'S SYNDROME: Primary | ICD-10-CM

## 2023-12-27 NOTE — PROGRESS NOTES
PROCEDURE NOTE: Intramuscular Botulinum Toxin-A (Botox) Injection Under Ultrasound Guidance    PROCEDURE DATE: 1-5-2024    PATIENT NAME: Fer Cantu  YOB: 1965    ATTENDING PHYSICIAN: Laya KEARNEY Cha, MD  PRIOR BOTOX  7-13-23  20 units per anterior scalene muscle on the bilateral side(s)  20 units per sternocleidomastoid muscle on the bilateral side(s)    10-5-23  25 units per anterior scalene muscle on the bilateral side(s)  25 units per sternocleidomastoid muscle on the bilateral side(s)    PREOPERATIVE DIAGNOSIS:   Cervical dystonia  (primary encounter diagnosis)  Compression of vein  Muscle pain     POSTOPERATIVE DIAGNOSIS: same    PROCEDURE PERFORMED: Bilateral Sternocleidomastoid and Anterior Scalene Muscle Botulinum Toxin-A (Botox) Injection Under Ultrasound Guidance    ULTRASOUND WAS USED.     INDICATIONS FOR THE PROCEDURE:  Fer Cantu is a 58 year old male who presents with cervical dystonia.    PROCEDURE AND FINDINGS:  He was greeted in the clinic. The risk, benefits and alternatives to the procedure were again reviewed with him and informed consent was obtained and the patient agreed to proceed. A time-out was performed. Following review alternatives, benefits and risks, the procedure was carried out under sterile prep with sterile gel. The use of direct sonographic guidance was used to ensure accurate placement of the needle (rather than non-guided injection) and required to minimize the risk of bleeding or injury to nearby neurovascular structures. Images were recorded and stored.     A 15-6MHz ultrasound transducer was used to visualize the relevant structures and determine the optimal needle path for the procedure. A 27 gauge 1.5 inch needle was advanced utilizing an out-of-plane approach, under continuous ultrasound guidance to the sternocleidomastoid and anterior scalene muscle(s) on the bilateral side(s). The tip of the needle was visualized throughout the  procedure. The remainder of the single-use vials were discarded.      100 units of botulinum toxin was diluted 50 units/mL of preservative free saline. The following muscles were injected:    20 units per anterior scalene muscle on the bilateral side(s)  25 units per sternocleidomastoid muscle on the bilateral side(s)  ____________________________________  90 total units used.   The remainder (10 units) of the single-use vials were discarded.        He tolerated the procedure well, was discharged home in stable condition.     Follow-up will be in clinic      COMPLICATIONS: None    COMMENTS: None

## 2023-12-29 NOTE — TELEPHONE ENCOUNTER
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

## 2024-01-05 ENCOUNTER — OFFICE VISIT (OUTPATIENT)
Dept: NEUROLOGY | Facility: CLINIC | Age: 59
End: 2024-01-05
Payer: COMMERCIAL

## 2024-01-05 DIAGNOSIS — I87.1 COMPRESSION OF VEIN: ICD-10-CM

## 2024-01-05 DIAGNOSIS — M79.10 MUSCLE PAIN: ICD-10-CM

## 2024-01-05 DIAGNOSIS — G24.3 CERVICAL DYSTONIA: Primary | ICD-10-CM

## 2024-01-05 PROCEDURE — 64616 CHEMODENERV MUSC NECK DYSTON: CPT | Mod: 50 | Performed by: PSYCHIATRY & NEUROLOGY

## 2024-01-05 PROCEDURE — 99207 PR DROP WITH A PROCEDURE: CPT | Mod: 25 | Performed by: PSYCHIATRY & NEUROLOGY

## 2024-01-05 PROCEDURE — 76942 ECHO GUIDE FOR BIOPSY: CPT | Performed by: PSYCHIATRY & NEUROLOGY

## 2024-01-05 NOTE — NURSING NOTE
Chief Complaint   Patient presents with    Procedure     Here for injections, confirmed with patient       Jessica Joshua

## 2024-02-28 ENCOUNTER — PRE VISIT (OUTPATIENT)
Dept: OTOLARYNGOLOGY | Facility: CLINIC | Age: 59
End: 2024-02-28

## 2024-05-19 ENCOUNTER — HEALTH MAINTENANCE LETTER (OUTPATIENT)
Age: 59
End: 2024-05-19

## 2024-08-12 DIAGNOSIS — I87.1 COMPRESSION OF VEIN: Primary | ICD-10-CM

## 2024-08-12 DIAGNOSIS — I99.8 ISCHEMIA: ICD-10-CM

## 2024-08-14 DIAGNOSIS — I87.1 COMPRESSION OF VEIN: ICD-10-CM

## 2024-08-14 DIAGNOSIS — I99.8 ISCHEMIA: Primary | ICD-10-CM

## 2024-08-14 RX ORDER — CLOPIDOGREL BISULFATE 75 MG/1
75 TABLET ORAL DAILY
Qty: 30 TABLET | Refills: 2 | Status: SHIPPED | OUTPATIENT
Start: 2024-08-14

## 2024-11-07 DIAGNOSIS — I99.8 ISCHEMIA: ICD-10-CM

## 2024-11-07 RX ORDER — CLOPIDOGREL BISULFATE 75 MG/1
75 TABLET ORAL DAILY
Qty: 30 TABLET | Refills: 2 | Status: SHIPPED | OUTPATIENT
Start: 2024-11-07

## 2024-11-07 NOTE — TELEPHONE ENCOUNTER
RX Authorization    Medication:  clopidogrel 75 mg tabs    Date last refill ordered:  08/14/2024    Quantity ordered:  30    # refills:  2    Date of last clinic visit with ordering provider:  01/05/2024    Date of next clinic visit with ordering provider:    All pertinent protocol data (lab date/result):    Include pertinent information from patients message:

## 2024-12-04 ENCOUNTER — TRANSFERRED RECORDS (OUTPATIENT)
Dept: HEALTH INFORMATION MANAGEMENT | Facility: CLINIC | Age: 59
End: 2024-12-04

## 2025-01-09 ENCOUNTER — TRANSFERRED RECORDS (OUTPATIENT)
Dept: HEALTH INFORMATION MANAGEMENT | Facility: CLINIC | Age: 60
End: 2025-01-09

## 2025-01-29 DIAGNOSIS — I99.8 ISCHEMIA: ICD-10-CM

## 2025-01-29 RX ORDER — CLOPIDOGREL BISULFATE 75 MG/1
75 TABLET ORAL DAILY
Qty: 30 TABLET | Refills: 2 | Status: SHIPPED | OUTPATIENT
Start: 2025-01-29

## 2025-01-29 NOTE — TELEPHONE ENCOUNTER
A reminder for a return visit was sent to the pharmacy.                       RX Authorization    Medication: clopidogrel (PLAVIX) 75 MG tablet     Date last refill ordered: 11/07/2024    Quantity ordered: 30    # refills: 2    Date of last clinic visit with ordering provider: 01/05/2024    Date of next clinic visit with ordering provider: None Scheduled

## 2025-02-24 ENCOUNTER — TRANSCRIBE ORDERS (OUTPATIENT)
Dept: OTHER | Age: 60
End: 2025-02-24

## 2025-02-24 DIAGNOSIS — G93.2 BENIGN INTRACRANIAL HYPERTENSION: ICD-10-CM

## 2025-02-24 DIAGNOSIS — R53.83 OTHER FATIGUE: ICD-10-CM

## 2025-02-24 DIAGNOSIS — R51.9 HEAD ACHE: ICD-10-CM

## 2025-02-24 DIAGNOSIS — I87.1 COMPRESSION OF VEIN: Primary | ICD-10-CM

## 2025-06-08 ENCOUNTER — HEALTH MAINTENANCE LETTER (OUTPATIENT)
Age: 60
End: 2025-06-08